# Patient Record
Sex: FEMALE | HISPANIC OR LATINO | URBAN - METROPOLITAN AREA
[De-identification: names, ages, dates, MRNs, and addresses within clinical notes are randomized per-mention and may not be internally consistent; named-entity substitution may affect disease eponyms.]

---

## 2018-06-14 ENCOUNTER — OFFICE VISIT (OUTPATIENT)
Dept: URBAN - METROPOLITAN AREA CLINIC 17 | Facility: CLINIC | Age: 33
End: 2018-06-14
Payer: COMMERCIAL

## 2018-06-14 DIAGNOSIS — H02.89 OTHER SPECIFIED DISORDERS OF EYELID: Primary | Chronic | ICD-10-CM

## 2018-06-14 PROCEDURE — 92012 INTRM OPH EXAM EST PATIENT: CPT | Performed by: OPHTHALMOLOGY

## 2018-06-14 ASSESSMENT — INTRAOCULAR PRESSURE
OS: 17
OD: 17

## 2018-06-14 NOTE — IMPRESSION/PLAN
Impression: Other specified disorders of eyelid: H02.89. OU. Condition: established, stable. Symptoms: will continue to monitor. Vision: vision not affected. Plan: Discussed diagnosis in detail with patient. Discussed treatment options with patient. No treatment is required at this time, symptoms likely relate to lymphatic channels that only occur with forced pressure. Surgery not indicated, will monitor. Will continue to observe condition and or symptoms. Reassured patient of current condition and treatment. Baseline photo today to grazyna location.

## 2018-07-19 ENCOUNTER — OFFICE VISIT (OUTPATIENT)
Dept: URBAN - METROPOLITAN AREA CLINIC 17 | Facility: CLINIC | Age: 33
End: 2018-07-19
Payer: COMMERCIAL

## 2018-07-19 DIAGNOSIS — S05.00XA CORNEAL ABRASION WITHOUT FB OF EYE, INITIAL ENCOUNTER: Primary | ICD-10-CM

## 2018-07-19 PROCEDURE — 99213 OFFICE O/P EST LOW 20 MIN: CPT | Performed by: OPTOMETRIST

## 2018-07-19 RX ORDER — TOBRAMYCIN AND DEXAMETHASONE 3; 1 MG/ML; MG/ML
SUSPENSION/ DROPS OPHTHALMIC
Qty: 1 | Refills: 0 | Status: ACTIVE
Start: 2018-07-19

## 2018-07-19 ASSESSMENT — INTRAOCULAR PRESSURE
OS: 13
OD: 15

## 2018-07-19 NOTE — IMPRESSION/PLAN
Impression: Corneal abrasion without FB of eye, initial encounter: S05.00XA. OD. Plan: Discussed diagnosis in detail with patient. Discussed treatment options with patient. Tobradex QID OD x1 week then d/c. ERx'd to [facility] in [place]. Patient to return PRN.

## 2020-12-04 ENCOUNTER — OFFICE VISIT (OUTPATIENT)
Dept: URBAN - METROPOLITAN AREA CLINIC 17 | Facility: CLINIC | Age: 35
End: 2020-12-04
Payer: COMMERCIAL

## 2020-12-04 DIAGNOSIS — H04.123 DRY EYE SYNDROME OF BILATERAL LACRIMAL GLANDS: ICD-10-CM

## 2020-12-04 DIAGNOSIS — H31.093 OTHER CHORIORETINAL SCARS, BILATERAL: ICD-10-CM

## 2020-12-04 DIAGNOSIS — H53.149 ASTHENOPIA: Primary | ICD-10-CM

## 2020-12-04 PROCEDURE — 99213 OFFICE O/P EST LOW 20 MIN: CPT | Performed by: OPTOMETRIST

## 2020-12-04 ASSESSMENT — INTRAOCULAR PRESSURE
OS: 16
OD: 16

## 2020-12-04 NOTE — IMPRESSION/PLAN
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 Plan: Discussed diagnosis in detail with patient. Recommend wearing glasses full time to help relieve with eye strain. Recommend FU with PCP to determine cause of headaches.

## 2020-12-04 NOTE — IMPRESSION/PLAN
Impression: Other chorioretinal scars, bilateral: H31.093. Plan: S/p Laser. Discussed diagnosis with patient. No treatment is required. Will continue to monitor.

## 2022-07-13 ENCOUNTER — OFFICE VISIT (OUTPATIENT)
Dept: URBAN - METROPOLITAN AREA CLINIC 17 | Facility: CLINIC | Age: 37
End: 2022-07-13
Payer: COMMERCIAL

## 2022-07-13 DIAGNOSIS — H33.322 ROUND HOLE, LEFT EYE: ICD-10-CM

## 2022-07-13 DIAGNOSIS — H04.123 DRY EYE SYNDROME OF BILATERAL LACRIMAL GLANDS: Primary | ICD-10-CM

## 2022-07-13 DIAGNOSIS — H31.093 OTHER CHORIORETINAL SCARS, BILATERAL: ICD-10-CM

## 2022-07-13 PROCEDURE — 99214 OFFICE O/P EST MOD 30 MIN: CPT | Performed by: OPTOMETRIST

## 2022-07-13 ASSESSMENT — INTRAOCULAR PRESSURE
OD: 18
OS: 18